# Patient Record
Sex: MALE | Race: OTHER
[De-identification: names, ages, dates, MRNs, and addresses within clinical notes are randomized per-mention and may not be internally consistent; named-entity substitution may affect disease eponyms.]

---

## 2018-03-29 ENCOUNTER — HOSPITAL ENCOUNTER (EMERGENCY)
Dept: HOSPITAL 62 - ER | Age: 28
Discharge: HOME | End: 2018-03-29
Payer: COMMERCIAL

## 2018-03-29 VITALS — SYSTOLIC BLOOD PRESSURE: 128 MMHG | DIASTOLIC BLOOD PRESSURE: 84 MMHG

## 2018-03-29 DIAGNOSIS — M25.552: ICD-10-CM

## 2018-03-29 DIAGNOSIS — S62.324A: Primary | ICD-10-CM

## 2018-03-29 DIAGNOSIS — R07.9: ICD-10-CM

## 2018-03-29 DIAGNOSIS — R51: ICD-10-CM

## 2018-03-29 DIAGNOSIS — M54.2: ICD-10-CM
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
## 2018-03-29 DIAGNOSIS — R10.9: ICD-10-CM

## 2018-03-29 LAB
ADD MANUAL DIFF: NO
ALBUMIN SERPL-MCNC: 4.5 G/DL (ref 3.5–5)
ALP SERPL-CCNC: 52 U/L (ref 38–126)
ALT SERPL-CCNC: 31 U/L (ref 21–72)
ANION GAP SERPL CALC-SCNC: 11 MMOL/L (ref 5–19)
APPEARANCE UR: CLEAR
APTT PPP: YELLOW S
AST SERPL-CCNC: 29 U/L (ref 17–59)
BASOPHILS # BLD AUTO: 0 10^3/UL (ref 0–0.2)
BASOPHILS NFR BLD AUTO: 0.5 % (ref 0–2)
BILIRUB DIRECT SERPL-MCNC: 0.3 MG/DL (ref 0–0.4)
BILIRUB SERPL-MCNC: 0.5 MG/DL (ref 0.2–1.3)
BILIRUB UR QL STRIP: NEGATIVE
BUN SERPL-MCNC: 15 MG/DL (ref 7–20)
CALCIUM: 9.2 MG/DL (ref 8.4–10.2)
CHLORIDE SERPL-SCNC: 101 MMOL/L (ref 98–107)
CO2 SERPL-SCNC: 27 MMOL/L (ref 22–30)
EOSINOPHIL # BLD AUTO: 0 10^3/UL (ref 0–0.6)
EOSINOPHIL NFR BLD AUTO: 0.5 % (ref 0–6)
ERYTHROCYTE [DISTWIDTH] IN BLOOD BY AUTOMATED COUNT: 12.9 % (ref 11.5–14)
ETHANOL SERPL-MCNC: < 10 MG/DL
GLUCOSE SERPL-MCNC: 102 MG/DL (ref 75–110)
GLUCOSE UR STRIP-MCNC: NEGATIVE MG/DL
HCT VFR BLD CALC: 45 % (ref 37.9–51)
HGB BLD-MCNC: 15.4 G/DL (ref 13.5–17)
KETONES UR STRIP-MCNC: NEGATIVE MG/DL
LYMPHOCYTES # BLD AUTO: 1.7 10^3/UL (ref 0.5–4.7)
LYMPHOCYTES NFR BLD AUTO: 18 % (ref 13–45)
MCH RBC QN AUTO: 30 PG (ref 27–33.4)
MCHC RBC AUTO-ENTMCNC: 34.1 G/DL (ref 32–36)
MCV RBC AUTO: 88 FL (ref 80–97)
MONOCYTES # BLD AUTO: 0.4 10^3/UL (ref 0.1–1.4)
MONOCYTES NFR BLD AUTO: 4.7 % (ref 3–13)
NEUTROPHILS # BLD AUTO: 7.2 10^3/UL (ref 1.7–8.2)
NEUTS SEG NFR BLD AUTO: 76.3 % (ref 42–78)
NITRITE UR QL STRIP: NEGATIVE
PH UR STRIP: 8 [PH] (ref 5–9)
PLATELET # BLD: 305 10^3/UL (ref 150–450)
POTASSIUM SERPL-SCNC: 3.9 MMOL/L (ref 3.6–5)
PROT SERPL-MCNC: 7.5 G/DL (ref 6.3–8.2)
PROT UR STRIP-MCNC: NEGATIVE MG/DL
RBC # BLD AUTO: 5.12 10^6/UL (ref 4.35–5.55)
SODIUM SERPL-SCNC: 138.7 MMOL/L (ref 137–145)
SP GR UR STRIP: 1.04
TOTAL CELLS COUNTED % (AUTO): 100 %
UROBILINOGEN UR-MCNC: NEGATIVE MG/DL (ref ?–2)
WBC # BLD AUTO: 9.5 10^3/UL (ref 4–10.5)

## 2018-03-29 PROCEDURE — 93005 ELECTROCARDIOGRAM TRACING: CPT

## 2018-03-29 PROCEDURE — 96365 THER/PROPH/DIAG IV INF INIT: CPT

## 2018-03-29 PROCEDURE — 99284 EMERGENCY DEPT VISIT MOD MDM: CPT

## 2018-03-29 PROCEDURE — 73030 X-RAY EXAM OF SHOULDER: CPT

## 2018-03-29 PROCEDURE — 29125 APPL SHORT ARM SPLINT STATIC: CPT

## 2018-03-29 PROCEDURE — 71260 CT THORAX DX C+: CPT

## 2018-03-29 PROCEDURE — 85025 COMPLETE CBC W/AUTO DIFF WBC: CPT

## 2018-03-29 PROCEDURE — 74177 CT ABD & PELVIS W/CONTRAST: CPT

## 2018-03-29 PROCEDURE — 80307 DRUG TEST PRSMV CHEM ANLYZR: CPT

## 2018-03-29 PROCEDURE — 73610 X-RAY EXAM OF ANKLE: CPT

## 2018-03-29 PROCEDURE — 93010 ELECTROCARDIOGRAM REPORT: CPT

## 2018-03-29 PROCEDURE — 36415 COLL VENOUS BLD VENIPUNCTURE: CPT

## 2018-03-29 PROCEDURE — 73130 X-RAY EXAM OF HAND: CPT

## 2018-03-29 PROCEDURE — 96375 TX/PRO/DX INJ NEW DRUG ADDON: CPT

## 2018-03-29 PROCEDURE — 81001 URINALYSIS AUTO W/SCOPE: CPT

## 2018-03-29 PROCEDURE — 70450 CT HEAD/BRAIN W/O DYE: CPT

## 2018-03-29 PROCEDURE — 80076 HEPATIC FUNCTION PANEL: CPT

## 2018-03-29 PROCEDURE — 80048 BASIC METABOLIC PNL TOTAL CA: CPT

## 2018-03-29 PROCEDURE — 72125 CT NECK SPINE W/O DYE: CPT
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
## 2022-10-05 NOTE — RADIOLOGY REPORT (SQ)
EXAM DESCRIPTION:  ANKLE RIGHT COMPLETE



COMPLETED DATE/TIME:  3/29/2018 9:15 am



REASON FOR STUDY:  Trauma



COMPARISON:  None.



NUMBER OF VIEWS:  Three views.



TECHNIQUE:  AP, lateral, and oblique radiographic images acquired of the right ankle.



LIMITATIONS:  None.



FINDINGS:  MINERALIZATION: Normal.

BONES: No acute fracture or dislocation.  No worrisome bone lesions.

JOINTS: No effusions.  Normal alignment at the ankle mortise

SOFT TISSUES: No soft tissue swelling. No foreign body.

OTHER: No other significant finding.



IMPRESSION:  NEGATIVE STUDY OF THE RIGHT ANKLE. NO RADIOGRAPHIC EVIDENCE OF ACUTE INJURY.



TECHNICAL DOCUMENTATION:  JOB ID:  [id number]

 2011 [employer]- All Rights Reserved



Reading location - IP/workstation name: [email or web address]
EXAM DESCRIPTION:  CT ABD/PELVIS WITH IV ONLY



COMPLETED DATE/TIME:  3/29/2018 8:56 am



REASON FOR STUDY:  Trauma



COMPARISON:  None.



TECHNIQUE:  CT scan of the abdomen and pelvis performed using helical scanning technique with dynamic
 intravenous contrast injection.  No oral contrast. Images reviewed with lung, soft tissue, and bone 
windows. Reconstructed coronal and sagittal MPR images reviewed. Delayed images for evaluation of the
 urinary system also acquired. All images stored on PACS.

All CT scanners at this facility use dose modulation, iterative reconstruction, and/or weight based d
osing when appropriate to reduce radiation dose to as low as reasonably achievable (ALARA).

[facility]: Dose Right  [facility]: CareDose    [facility]: Dose Right    [facility]: Teradose 4D    [facility]: [employer]



CONTRAST TYPE AND DOSE:  contrast/concentration: Isovue 370.00 mg/ml; Total Contrast Delivered: 81.0 
ml; Total Saline Delivered: 68.0 ml



RENAL FUNCTION:  None required. The patient is less than 50 years old.



RADIATION DOSE:  7.55 mGy.



LIMITATIONS:  None.



FINDINGS:  LOWER CHEST: No significant findings. No nodules or infiltrates.

LIVER: Normal size. No masses.  No dilated ducts.

SPLEEN: Normal size. No focal lesions.

PANCREAS: No masses. No significant calcifications. No adjacent inflammation or peripancreatic fluid 
collections. Pancreatic duct not dilated.

GALLBLADDER: No identified stones by CT criteria. No inflammatory changes to suggest cholecystitis.

ADRENAL GLANDS: No significant masses or asymmetry.

RIGHT KIDNEY AND URETER: No solid masses.   No significant calcifications.   No hydronephrosis or hyd
roureter.

LEFT KIDNEY AND URETER: No solid masses.   No significant calcifications.   No hydronephrosis or hydr
oureter.

AORTA AND VESSELS: No aneurysm. No dissection. Renal arteries, SMA, celiac without stenosis.

RETROPERITONEUM: No retroperitoneal adenopathy, hemorrhage or masses.

BOWEL AND PERITONEAL CAVITY: No masses or inflammatory changes. No free fluid or peritoneal masses.

APPENDIX: Normal.

PELVIS: No mass.  No free fluid. Normal bladder.

ABDOMINAL WALL: No masses. No hernias.

BONES: No significant or acute findings.

OTHER: No other significant finding.



IMPRESSION:  NO SIGNIFICANT OR ACUTE FINDING IN THE ABDOMEN OR PELVIS ON CT SCAN WITH IV CONTRAST.



TECHNICAL DOCUMENTATION:  JOB ID:  [id number]

Quality ID # 436: Final reports with documentation of one or more dose reduction techniques (e.g., Au
tomated exposure control, adjustment of the mA and/or kV according to patient size, use of iterative 
reconstruction technique)

 2011 [employer]- All Rights Reserved



Reading location - IP/workstation name: [facility]
EXAM DESCRIPTION:  CT CERVICAL SPINE WITHOUT



COMPLETED DATE/TIME:  3/29/2018 8:56 am



REASON FOR STUDY:  Trauma



COMPARISON:  None.



TECHNIQUE:  Axial images acquired through the cervical spine without intravenous contrast.  Images re
viewed with lung, soft tissue and bone windows.  Reconstructed coronal and sagittal MPR images review
ed.  Images stored on PACS.

All CT scanners at this facility use dose modulation, iterative reconstruction, and/or weight based d
osing when appropriate to reduce radiation dose to as low as reasonably achievable (ALARA).

[facility]: Dose Right  [facility]: CareDose    [facility]: Dose Right    [facility]: Teradose 4D    [facility]: [employer]



RADIATION DOSE:  CT Rad equipment meets quality standard of care and radiation dose reduction techniq
ues were employed. CTDIvol: 19.8 mGy. DLP: 385 mGy-cm. mGy.



LIMITATIONS:  None.



FINDINGS:  ALIGNMENT: Anatomic.

MINERALIZATION: Normal.

VERTEBRAL BODIES: No fractures or dislocation.

DISCS: No significant disc disease.

FACETS, LATERAL MASSES, POSTERIOR ELEMENTS: No fractures.  No dislocation.  No acute findings.

HARDWARE: None in the spine.

VISUALIZED RIBS: No fractures.

LUNG APICES AND SOFT TISSUES: No significant or acute findings.

OTHER: No other significant finding.



IMPRESSION:  NO ACUTE OR SIGNIFICANT FINDINGS IN THE CERVICAL SPINE.



TECHNICAL DOCUMENTATION:  JOB ID:  [id number]

Quality ID # 436: Final reports with documentation of one or more dose reduction techniques (e.g., Au
tomated exposure control, adjustment of the mA and/or kV according to patient size, use of iterative 
reconstruction technique)

 2011 [employer]- All Rights Reserved



Reading location - IP/workstation name: [facility]
EXAM DESCRIPTION:  CT CHEST WITH



COMPLETED DATE/TIME:  3/29/2018 8:56 am



REASON FOR STUDY:  Trauma



COMPARISON:  None.



TECHNIQUE:  CT scan of the chest performed using helical scanning technique with dynamic intravenous 
contrast injection.  Images reviewed with lung, soft tissue and bone windows.  Reconstructed coronal 
and sagittal MPR images reviewed.  All images stored on PACS.

All CT scanners at this facility use dose modulation, iterative reconstruction, and/or weight based d
osing when appropriate to reduce radiation dose to as low as reasonably achievable (ALARA).

[facility]: Dose Right  [facility]: CareDose    [facility]: Dose Right    [facility]: Teradose 4D    [facility]: [employer]



CONTRAST TYPE AND DOSE:  81.2 Isovue 370- low osmolar.



RENAL FUNCTION:  None required. The patient is less than 50 years old.



RADIATION DOSE:  CT Rad equipment meets quality standard of care and radiation dose reduction techniq
ues were employed. CTDIvol: 6.7 - 7.6 mGy. DLP: 910 mGy-cm. .



LIMITATIONS:  None.



FINDINGS:  LUNGS AND PLEURA: Minimal dependent atelectasis.  No significant opacities, nodules, hussain
s.  No pneumothorax. No effusions.

HILAR AND MEDIASTINAL STRUCTURES: No identified masses or abnormal nodes.

HEART AND VASCULAR STRUCTURES: No aneurysm or dissection.  No central pulmonary emboli.  No pericardi
al effusion.

HARDWARE: None in the chest.

UPPER ABDOMEN: See separate report of the CT of the abdomen.

THYROID AND OTHER SOFT TISSUES: No masses.  No adenopathy.

BONES: No significant finding.

OTHER: No other significant finding.



IMPRESSION:  Minimal dependent atelectasis otherwise negative chest CT.  No evidence of acute injury.




TECHNICAL DOCUMENTATION:  JOB ID:  [id number]

Quality ID # 436: Final reports with documentation of one or more dose reduction techniques (e.g., Au
tomated exposure control, adjustment of the mA and/or kV according to patient size, use of iterative 
reconstruction technique)

 2011 [employer]- All Rights Reserved



Reading location - IP/workstation name: [facility]
EXAM DESCRIPTION:  CT HEAD WITHOUT



COMPLETED DATE/TIME:  3/29/2018 8:56 am



REASON FOR STUDY:  Trauma



COMPARISON:  None.



TECHNIQUE:  Axial images acquired through the brain without intravenous contrast.  Images reviewed wi
th bone, brain and subdural windows.  Additional sagittal and coronal reconstructions were generated.
 Images stored on PACS.

All CT scanners at this facility use dose modulation, iterative reconstruction, and/or weight based d
osing when appropriate to reduce radiation dose to as low as reasonably achievable (ALARA).

[facility]: Dose Right  [facility]: CareDose    [facility]: Dose Right    [facility]: Teradose 4D    [facility]: [employer]



RADIATION DOSE:  CT Rad equipment meets quality standard of care and radiation dose reduction techniq
ues were employed. CTDIvol: 53.2 mGy. DLP: 1017 mGy-cm. mGy.



LIMITATIONS:  None.



FINDINGS:  VENTRICLES: Normal size and contour.

CEREBRUM: No masses.  No hemorrhage.  No midline shift.  No evidence for acute infarction. Normal gra
y/white matter differentiation. No areas of low density in the white matter.

CEREBELLUM: No masses.  No hemorrhage.  No alteration of density.  No evidence for acute infarction.

EXTRAAXIAL SPACES: No fluid collections.  No masses.

ORBITS AND GLOBE: No intra- or extraconal masses.  Normal contour of globe without masses.

CALVARIUM: No fracture.

PARANASAL SINUSES: No fluid or mucosal thickening.

SOFT TISSUES: No mass or hematoma.

OTHER: No other significant finding.



IMPRESSION:  NORMAL BRAIN CT WITHOUT CONTRAST.

EVIDENCE OF ACUTE STROKE: NO.



COMMENT:  Quality ID # 436: Final reports with documentation of one or more dose reduction techniques
 (e.g., Automated exposure control, adjustment of the mA and/or kV according to patient size, use of 
iterative reconstruction technique)



TECHNICAL DOCUMENTATION:  JOB ID:  [id number]

 2011 [employer]- All Rights Reserved



Reading location - IP/workstation name: [facility]
EXAM DESCRIPTION:  HAND RIGHT 3 VIEWS



COMPLETED DATE/TIME:  3/29/2018 9:15 am



REASON FOR STUDY:  Trauma



COMPARISON:  None.



EXAM PARAMETERS:  NUMBER OF VIEWS: Three views.

TECHNIQUE: AP, lateral and oblique  radiographic images acquired of the right hand.

LIMITATIONS: None.



FINDINGS:  MINERALIZATION: Normal.

BONES: Spiral fracture of the proximal diaphysis of the 4th metacarpal approximately 1/4 shaft width 
medial displacement.

JOINTS: No effusions.

SOFT TISSUES: No foreign body.

OTHER: No other significant finding.



IMPRESSION:  Fracture of the proximal 4th metacarpal.



TECHNICAL DOCUMENTATION:  JOB ID:  [id number]

 2011 [employer]- All Rights Reserved



Reading location - IP/workstation name: [email or web address]
EXAM DESCRIPTION:  SHOULDER LEFT 2 OR MORE VIEWS



COMPLETED DATE/TIME:  3/29/2018 9:15 am



REASON FOR STUDY:  Trauma



COMPARISON:  None.



NUMBER OF VIEWS:  Three views.



TECHNIQUE:  Internal rotation, external rotation, and Y view images acquired of the left shoulder.



LIMITATIONS:  None.



FINDINGS:  MINERALIZATION: Normal.

BONES: No acute fracture or dislocation.  No worrisome bone lesions.

JOINTS: No dislocation.

VISUALIZED LUNGS AND RIBS: No pneumothorax.  No rib fracture.

SOFT TISSUES: No radiopaque foreign body.

OTHER: No other significant finding.



IMPRESSION:  NEGATIVE STUDY OF THE LEFT SHOULDER. NO RADIOGRAPHIC EVIDENCE OF ACUTE INJURY.



TECHNICAL DOCUMENTATION:  JOB ID:  [id number]

 2011 [employer]- All Rights Reserved



Reading location - IP/workstation name: [email or web address]
no